# Patient Record
Sex: FEMALE | Race: WHITE
[De-identification: names, ages, dates, MRNs, and addresses within clinical notes are randomized per-mention and may not be internally consistent; named-entity substitution may affect disease eponyms.]

---

## 2017-12-25 ENCOUNTER — HOSPITAL ENCOUNTER (EMERGENCY)
Dept: HOSPITAL 10 - FTE | Age: 37
Discharge: HOME | End: 2017-12-25
Payer: MEDICAID

## 2017-12-25 VITALS
SYSTOLIC BLOOD PRESSURE: 115 MMHG | TEMPERATURE: 97.7 F | RESPIRATION RATE: 18 BRPM | HEART RATE: 99 BPM | DIASTOLIC BLOOD PRESSURE: 66 MMHG

## 2017-12-25 VITALS — WEIGHT: 210.98 LBS | BODY MASS INDEX: 48.83 KG/M2 | HEIGHT: 55 IN

## 2017-12-25 DIAGNOSIS — J20.9: Primary | ICD-10-CM

## 2017-12-25 DIAGNOSIS — B00.1: ICD-10-CM

## 2017-12-25 PROCEDURE — 71020: CPT

## 2017-12-25 PROCEDURE — 94664 DEMO&/EVAL PT USE INHALER: CPT

## 2017-12-25 NOTE — RADRPT
PROCEDURE:   XR Chest. 

 

CLINICAL INDICATION:   Cough

 

TECHNIQUE:  AP and lateral view of the chest were obtained. 

 

COMPARISON:   12/25/2017 

 

FINDINGS:

 

Cardiac/vascular structures:  Normal cardiomediastinal silhouette. 

 

Pulmonary:  Lungs are clear. No pleural effusion. No evidence of pneumothorax.

 

Osseous structures:  Normal

 

Soft tissues: Normal

 

IMPRESSION:

No acute cardiopulmonary disease. 

 

 

RPTAT:AAJJ

_____________________________________________ 

Physician Aleena           Date    Time 

Electronically viewed and signed by Rosales Pinon Physician on 12/25/2017 15:18 

 

D:  12/25/2017 15:18  T:  12/25/2017 15:18

/

## 2017-12-25 NOTE — ERD
ER Documentation


Chief Complaint


Chief Complaint


fever/cough/cold sore [id number]





HPI


Patient is a 37-year-old female presents to the ED for concerns of a fever, 

cough and cold sores 10 days.  She reports tactile fevers for the last 10 

days.  Patient states she took Advil yesterday.  Patient denies taking any 

antipyretics today.  Patient states initially had a dry cough however it is now 

become productive.  Patient reports clear sputum production with occasional 

specks of blood.  Patient denies any recent travel.  Patient does admit to sore 

throat is sore throat however she denies any trismus, drooling or 

hyperextension of her neck.  Patient does admit to an episode of posttussive 

vomiting.  She denies any nausea, vomiting or diarrhea.  Patient does admit to 

generalized body aches.  Patient's is also sick at this time.  Patient denies 

any chest pain, shortness of breath, left upper extremity pain, diaphoresis or 

LOC.  She denies any neck pain or neck stiffness.  Patient did not receive a 

flu vaccine this year.





ROS


All systems reviewed and are negative except as per history of present illness.





Medications


Home Meds


Active Scripts


Docosanol (Abreva) 2 Gm Cream.gm., 1 APPLIC TOP 5 TIMES DAILY, #1 TUB


   Prov:MIGUEL LOVE PA-C         17


Albuterol Sulfate* (Proair HFA*) 8.5 Gm Hfa.aer.ad, 2 PUFF INH Q4, #1 INHALER


   Prov:MIGUEL LOVE PA-C         17


Benzonatate* (Tessalon Perle*) 100 Mg Capsule, 100 MG PO Q8H Y for COUGH, #15 

CAP


   Prov:MIGUEL LOVE PA-C         17


Prednisone* (Prednisone*) 20 Mg Tab, 40 MG PO DAILY for 4 Days, TAB


   Prov:MIGUEL LOVE PA-C         17


Azithromycin* (Zithromax*) 250 Mg Tablet, 250 MG PO .ZPACK AS DIRECTED, #6 TAB


   TAKE 500 MG (2 TABS) THE FIRST DAY THEN 250 MG (1 TAB) DAYS 2-5


   Prov:MIGUEL LOVE PA-C         17


Oxycodone Hcl/Acetaminophen (Percocet) 1 Tab Tab, 2 TAB PO Q4H Y for PAIN LEVEL 

6-10, #30 TAB 0 Refills


   Prov:MACIEJ GALAN MD         16


Ibuprofen* (Ibuprofen*) 800 Mg Tablet, 800 MG PO Q8, #20 TAB 0 Refills


   Prov:MACIEJ GALAN MD         16





Allergies


Allergies:  


Coded Allergies:  


     No Known Drug Allergy (Verified  Allergy, Unknown, 14)





PMhx/Soc


Medical and Surgical Hx:  pt denies Medical Hx


History of Surgery:  Yes (c/section x2)


Anesthesia Reaction:  No


Hx Neurological Disorder:  No


Hx Respiratory Disorders:  No


Hx Cardiac Disorders:  No


Hx Psychiatric Problems:  No


Hx Miscellaneous Medical Probl:  No


Hx Alcohol Use:  No


Hx Substance Use:  No


Hx Tobacco Use:  No


Smoking Status:  Never smoker





Physical Exam


Vitals





Vital Signs








  Date Time  Temp Pulse Resp B/P Pulse Ox O2 Delivery O2 Flow Rate FiO2


 


17 15:45 97.7 99 18 115/66 97 Room Air  


 


17 14:19  85 19  96   21


 


17 12:42 98.1 95 20 124/79 96   








Physical Exam


GENERAL: Well-developed, well-nourished female. Appears in no acute distress.  

No abdominal retractions, nasal flaring, no tripoding.


HEAD: Normocephalic, atraumatic. No deformities or ecchymosis. 


EYE: Pupils equal, round, and reactive to light. EOMs intact. No conjunctival 

erythema. No eye discharge. 


ENT: External ear without any masses or tenderness. Auditory canals clear 

bilaterally. TM visualized bilaterally, non-erythematous, non-bulging. Nasal 

mucosa pink with no discharge. Oropharynx is pink without any tonsillar 

erythema or exudates. No uvula deviation. No kissing tonsils. 


NECK: Supple. No meningismus. Normal ROM of the neck.


LUNG: Faint wheezing noted bilaterally. No rhonchi or rales. 


HEART: Regular rate and rhythm. No murmurs, rubs or gallops.


EXTREMITIES: Equal pulses bilaterally. No peripheral clubbing, cyanosis or 

edema. No unilateral leg swelling.  


NEUROLOGIC: Alert and oriented to person, place and time. Moving all four 

extremities. 5/5 strength in all extremities. Normal speech. Steady gait. 


SKIN: 2 erythematous lesions noted on the patient's upper lip border consistent 

with cold sores


Results 24 hrs





 Current Medications








 Medications


  (Trade)  Dose


 Ordered  Sig/Yesika


 Route


 PRN Reason  Start Time


 Stop Time Status Last Admin


Dose Admin


 


 Prednisone


  (Prednisone)  40 mg  ONCE  ONCE


 PO


   17 14:00


 17 14:01 DC 17 14:13


 


 


 Albuterol


  (Proventil


 0.083% (Neb))  5 mg  ONCE  STAT


 NEB


   17 13:58


 17 13:59 DC 17 14:17


 


 


 Ipratropium


 Bromide


  (Atrovent 0.02%


  (Neb))  0.5 mg  ONCE  STAT


 NEB


   17 13:58


 17 13:59 DC 17 14:17


 











Procedures/MDM


ED COURSE:


The patient was stable throughout ED course. I kept the patient and/or family 

informed of laboratory and diagnostic imaging results throughout the ED course.

  


 


DIAGNOSTIC IMAGING:


Read by radiologist.


 Patient: LUCAS HILLS   : 1980   Age: 37  Sex: F                     

   


 MR #:    [id number]   Acct #:   [id number]    DOS: 17 1407


 Ordering MD: MIGUEL LOVE PA-C   Location:  [facility]   Room/Bed:                 

                           


 








PROCEDURE:   XR Chest. 


 


CLINICAL INDICATION:   Cough, fever 


 


TECHNIQUE:   PA and lateral views of the chest were obtained 


 


COMPARISON:   CR CHEST 6/15/2015 


 


FINDINGS:


The heart and mediastinum are within normal limits. 


There are bilateral nodular opacities in the lower lobes, left greater than 

right, likely corresponding to artifact outside to the patient anteriorly.


There is no focal infiltrate..


There is no pleural effusion or pneumothorax.  


The bones and soft tissues are unremarkable. 


 


RPTAT: AA


 


IMPRESSION:


 


Nodular opacities are likely external to the patient. Repeat study after 

removal of the anterior artifact is recommended.


No focal infiltrate.


_____________________________________________ 


.Abhishek Moon MD, MD           Date    Time 


Electronically viewed and signed by .Abhishek Moon MD, MD on 2017 14:

48 


 


D:  2017 14:48  T:  2017 14:48


.S/





CC: MIGUEL LOVE PA-C








 Patient: LUCAS HILLS   : 1980   Age: 37  Sex: F                     

   


 MR #:    [id number]   [facility]   [id number]    DOS: 17 1459


 Ordering MD: MIGUEL LOVE PA-C   Location:  [facility]   Room/Bed:                 

                           


 








PROCEDURE:   XR Chest. 


 


CLINICAL INDICATION:   Cough


 


TECHNIQUE:  AP and lateral view of the chest were obtained. 


 


COMPARISON:   2017 


 


FINDINGS:


 


Cardiac/vascular structures:  Normal cardiomediastinal silhouette. 


 


Pulmonary:  Lungs are clear. No pleural effusion. No evidence of pneumothorax.


 


Osseous structures:  Normal


 


Soft tissues: Normal


 


IMPRESSION:


No acute cardiopulmonary disease. 


 


 


RPTAT:AAJJ


_____________________________________________ 


Physician Aleena           Date    Time 


Electronically viewed and signed by Rosales Pinon Physician on 2017 15

:18 


 


D:  2017 15:18  T:  2017 15:18


MH/





CC: MIGUEL LOVE PA-C








MEDICATIONS GIVEN: 


Prednisone, albuterol, ipratropium breathing treatment.


Patient tolerated medication well with no adverse reactions.








MEDICAL DECISION MAKING:


This is a 37-year-old female presents ED for concerns of intermittent fevers, 

cough and cold sores 10 days.  Patient reported tactile fevers.  Vital signs 

were reviewed. Patient was afebrile. Patient was not hypoxic. ENT exam was 

normal.  Lung exam did reveal some wheezing.  Patient was given breathing 

treatment here in ED.  Upon reassessment, patient did report improvement in 

symptoms.  Chest x-ray was obtained.  Initial chest x-ray was clear thus a 

second chest x-ray was obtained per radiologist just recommendations.  Second 

chest x-ray showed no acute findings.  Given these findings, the patients 

presentation is most consistent with bronchitis and cold sores.  Low suspicion 

for CHF, pleural effusion, pneumonia, meningitis, sinusitis, otitis externa, 

acute otitis media, strep pharyngitis, epiglottitis or peritonsillar abscess.  

Given the patient reports having symptoms for 10 days now, we will treat the 

patient with a course of antibiotics.  Patient was nontoxic, non-ill-appearing 

prior to discharge.





PRESCRIPTIONS:


Abreva, Z-Dev, albuterol, prednisone. Tessalon perles








DISCHARGE:


At this time, patient is stable for discharge and outpatient management. 

Supportive therapies such as OTC throat lozenges, salt water gurgles, popsicles 

and jello discussed. I have instructed the patient to follow-up with his/her 

primary care physician in 1-2 days. I have instructed the patient to promptly 

return to the ER for any new or worsening symptoms including increased pain, 

swelling, fever, nausea, vomiting, weakness or difficulty breathing. The 

patient and/or family expressed understanding of and agreement with this plan. 

All questions were answered. Home care instructions were provided. 








Disclaimer: Inadvertent spelling and grammatical errors are likely due to EHR/

dictation software use and do not reflect on the overall quality of patient 

care. Also, please note that the electronic time recorded on this note does not 

necessarily reflect the actual time of the patient encounter.





Departure


Diagnosis:  


 Primary Impression:  


 Bronchitis


 Additional Impression:  


 Cold sore


Condition:  Stable


Patient Instructions:  Bronchitis, Antiobiotic Treatment (Adult)


Referrals:  


[facility]


YOU HAVE RECEIVED A MEDICAL SCREENING EXAM AND THE RESULTS INDICATE THAT YOU DO 

NOT HAVE A CONDITION THAT REQUIRES URGENT TREATMENT IN THE EMERGENCY DEPARTMENT.





FURTHER EVALUATION AND TREATMENT OF YOUR CONDITION CAN WAIT UNTIL YOU ARE SEEN 

IN YOUR DOCTORS OFFICE WITHIN THE NEXT 1-2 DAYS. IT IS YOUR RESPONSIBILITY TO 

MAKE AN APPOINTMENT FOR FOLOW-UP CARE.





IF YOU HAVE A PRIMARY DOCTOR


--you should call your primary doctor and schedule an appointment





IF YOU DO NOT HAVE A PRIMARY DOCTOR YOU CAN CALL OUR PHYSICIAN REFERRAL HOTLINE 

AT


 [phone] 





IF YOU CAN NOT AFFORD TO SEE A PHYSICIAN YOU CAN CHOSE FROM THE FOLLOWING 

[facility]





[facility] [phone] [address] [place] [address] [facility] [phone] [address] [facility]. [facility] [phone] [address] [facility] [phone] [address] [facility] [phone] [address] [facility]. [phone] [address] [facility]


YOU HAVE RECEIVED A MEDICAL SCREENING EXAM AND THE RESULTS INDICATE THAT YOU DO 

NOT HAVE A CONDITION THAT REQUIRES URGENT TREATMENT IN THE EMERGENCY DEPARTMENT.





FURTHER EVALUATION AND TREATMENT OF YOUR CONDITION CAN WAIT UNTIL YOU ARE SEEN 

IN YOUR DOCTORS OFFICE WITHIN THE NEXT 1-2 DAYS. IT IS YOUR RESPONSIBILITY TO 

MAKE AN APPOINTMENT FOR FOLOW-UP CARE.





IF YOU HAVE A PRIMARY DOCTOR


--you should call your primary doctor and schedule and appointment





IF YOU DO NOT HAVE A PRIMARY DOCTOR YOU CAN CALL OUR PHYSICIAN REFERRAL HOTLINE 

AT [phone].





IF YOU CAN NOT AFFORD TO SEE A PHYSICIAN YOU CAN CHOSE FROM THE FOLLOWING 

[facility] INSTITUTIONS:





[facility]


[address] [place] [address]





[facility]


[address] [place] [address]





[facility]


[address] [place] [address]





OB/GYN REFERRAL LIST


CARLITO WRIGHT MD


[address] 


[address] [place] [address]


[phone] OFFICE FAX [phone]





PATRIC LEVYNICA


[address] [place] [address]


[phone]





[facility]


[address] [place] [address]


[phone]





[facility]


[address], [address] [address]


[phone]





MACIEJ COLLAZO


[address] [place] [address]


[phone]





[facility]


[address] [place] [address]


[phone] [address] [facility] [address]


[phone]





GINA BRITO


[address], [place] [address]


[phone]





SUZIE CAMP


[address], [place] [address]


[phone]





BETH RICHARDSON


[address] [place] [address] [phone]





Additional Instructions:  


Call your primary care doctor TOMORROW for an appointment during the next 1-2 

days.See the doctor sooner or return here if your condition worsens before your 

appointment time.











MIGUEL LOVE PA-C Dec 25, 2017 15:36

## 2017-12-25 NOTE — RADRPT
PROCEDURE:   XR Chest. 

 

CLINICAL INDICATION:   Cough, fever 

 

TECHNIQUE:   PA and lateral views of the chest were obtained 

 

COMPARISON:   CR CHEST 6/15/2015 

 

FINDINGS:

The heart and mediastinum are within normal limits. 

There are bilateral nodular opacities in the lower lobes, left greater than right, likely correspond
ing to artifact outside to the patient anteriorly.

There is no focal infiltrate..

There is no pleural effusion or pneumothorax.  

The bones and soft tissues are unremarkable. 

 

RPTAT: AA

 

IMPRESSION:

 

Nodular opacities are likely external to the patient. Repeat study after removal of the anterior art
ifact is recommended.

No focal infiltrate.

_____________________________________________ 

.Abhishek Moon MD, MD           Date    Time 

Electronically viewed and signed by .Abhishek Moon MD, MD on 12/25/2017 14:48 

 

D:  12/25/2017 14:48  T:  12/25/2017 14:48

.S/

## 2022-10-23 ENCOUNTER — HOSPITAL ENCOUNTER (EMERGENCY)
Dept: HOSPITAL 54 - ER | Age: 42
Discharge: HOME | End: 2022-10-23
Payer: MEDICAID

## 2022-10-23 VITALS — WEIGHT: 190 LBS | HEIGHT: 66 IN | BODY MASS INDEX: 30.53 KG/M2

## 2022-10-23 VITALS — SYSTOLIC BLOOD PRESSURE: 138 MMHG | DIASTOLIC BLOOD PRESSURE: 62 MMHG

## 2022-10-23 DIAGNOSIS — R05.9: ICD-10-CM

## 2022-10-23 DIAGNOSIS — J06.9: Primary | ICD-10-CM

## 2022-10-23 DIAGNOSIS — Z79.899: ICD-10-CM

## 2022-10-23 NOTE — NUR
Patient discharged to home in stable condition. RX  Written and verbal after 
care instructions given. Patient verbalizes understanding of instruction. Pt 
ambulatory with a steady gait

## 2023-08-19 ENCOUNTER — HOSPITAL ENCOUNTER (EMERGENCY)
Dept: HOSPITAL 54 - ER | Age: 43
LOS: 1 days | Discharge: HOME | End: 2023-08-20
Payer: MEDICAID

## 2023-08-19 VITALS — BODY MASS INDEX: 38.32 KG/M2 | HEIGHT: 65 IN | WEIGHT: 230 LBS

## 2023-08-19 DIAGNOSIS — M79.602: Primary | ICD-10-CM

## 2023-08-19 DIAGNOSIS — Z79.899: ICD-10-CM

## 2023-08-19 PROCEDURE — 73060 X-RAY EXAM OF HUMERUS: CPT

## 2023-08-19 PROCEDURE — 96372 THER/PROPH/DIAG INJ SC/IM: CPT

## 2023-08-19 PROCEDURE — 99284 EMERGENCY DEPT VISIT MOD MDM: CPT

## 2023-08-19 PROCEDURE — 73030 X-RAY EXAM OF SHOULDER: CPT

## 2023-08-19 PROCEDURE — 73090 X-RAY EXAM OF FOREARM: CPT

## 2023-08-20 VITALS — DIASTOLIC BLOOD PRESSURE: 81 MMHG | OXYGEN SATURATION: 96 % | TEMPERATURE: 98.3 F | SYSTOLIC BLOOD PRESSURE: 149 MMHG
